# Patient Record
Sex: FEMALE | Race: BLACK OR AFRICAN AMERICAN | Employment: UNEMPLOYED | URBAN - METROPOLITAN AREA
[De-identification: names, ages, dates, MRNs, and addresses within clinical notes are randomized per-mention and may not be internally consistent; named-entity substitution may affect disease eponyms.]

---
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
## 2018-01-03 ENCOUNTER — HOSPITAL ENCOUNTER (EMERGENCY)
Age: 18
Discharge: HOME | End: 2018-01-03
Attending: EMERGENCY MEDICINE
Payer: MEDICAID

## 2018-01-03 VITALS
SYSTOLIC BLOOD PRESSURE: 121 MMHG | BODY MASS INDEX: 39.1 KG/M2 | DIASTOLIC BLOOD PRESSURE: 73 MMHG | HEIGHT: 68 IN | RESPIRATION RATE: 16 BRPM | TEMPERATURE: 98 F | HEART RATE: 84 BPM | WEIGHT: 258 LBS | OXYGEN SATURATION: 99 %

## 2018-01-03 DIAGNOSIS — J01.00 ACUTE NON-RECURRENT MAXILLARY SINUSITIS: Primary | ICD-10-CM

## 2018-01-03 PROCEDURE — 99282 EMERGENCY DEPT VISIT SF MDM: CPT

## 2018-01-03 RX ORDER — PSEUDOEPHEDRINE HYDROCHLORIDE 60 MG/1
60 TABLET ORAL
Qty: 20 TAB | Refills: 1 | Status: SHIPPED | OUTPATIENT
Start: 2018-01-03 | End: 2018-01-08

## 2018-01-03 RX ORDER — AMOXICILLIN AND CLAVULANATE POTASSIUM 875; 125 MG/1; MG/1
1 TABLET, FILM COATED ORAL 2 TIMES DAILY
Qty: 20 TAB | Refills: 0 | Status: SHIPPED | OUTPATIENT
Start: 2018-01-03 | End: 2018-01-13

## 2018-01-03 RX ORDER — TRAMADOL HYDROCHLORIDE 50 MG/1
50 TABLET ORAL
Qty: 12 TAB | Refills: 0 | Status: SHIPPED | OUTPATIENT
Start: 2018-01-03 | End: 2018-06-04

## 2018-01-03 NOTE — DISCHARGE INSTRUCTIONS
Sinusitis: Care Instructions  Your Care Instructions    Sinusitis is an infection of the lining of the sinus cavities in your head. Sinusitis often follows a cold. It causes pain and pressure in your head and face. In most cases, sinusitis gets better on its own in 1 to 2 weeks. But some mild symptoms may last for several weeks. Sometimes antibiotics are needed. Follow-up care is a key part of your treatment and safety. Be sure to make and go to all appointments, and call your doctor if you are having problems. It's also a good idea to know your test results and keep a list of the medicines you take. How can you care for yourself at home? · Take an over-the-counter pain medicine, such as acetaminophen (Tylenol), ibuprofen (Advil, Motrin), or naproxen (Aleve). Read and follow all instructions on the label. · If the doctor prescribed antibiotics, take them as directed. Do not stop taking them just because you feel better. You need to take the full course of antibiotics. · Be careful when taking over-the-counter cold or flu medicines and Tylenol at the same time. Many of these medicines have acetaminophen, which is Tylenol. Read the labels to make sure that you are not taking more than the recommended dose. Too much acetaminophen (Tylenol) can be harmful. · Breathe warm, moist air from a steamy shower, a hot bath, or a sink filled with hot water. Avoid cold, dry air. Using a humidifier in your home may help. Follow the directions for cleaning the machine. · Use saline (saltwater) nasal washes to help keep your nasal passages open and wash out mucus and bacteria. You can buy saline nose drops at a grocery store or drugstore. Or you can make your own at home by adding 1 teaspoon of salt and 1 teaspoon of baking soda to 2 cups of distilled water. If you make your own, fill a bulb syringe with the solution, insert the tip into your nostril, and squeeze gently. Andrea Raider your nose.   · Put a hot, wet towel or a warm gel pack on your face 3 or 4 times a day for 5 to 10 minutes each time. · Try a decongestant nasal spray like oxymetazoline (Afrin). Do not use it for more than 3 days in a row. Using it for more than 3 days can make your congestion worse. When should you call for help? Call your doctor now or seek immediate medical care if:  ? · You have new or worse swelling or redness in your face or around your eyes. ? · You have a new or higher fever. ? Watch closely for changes in your health, and be sure to contact your doctor if:  ? · You have new or worse facial pain. ? · The mucus from your nose becomes thicker (like pus) or has new blood in it. ? · You are not getting better as expected. Where can you learn more? Go to http://alejandro-flakito.info/. Enter H178 in the search box to learn more about \"Sinusitis: Care Instructions. \"  Current as of: May 12, 2017  Content Version: 11.4  © 9338-7821 [employer]. Care instructions adapted under license by [employer] (which disclaims liability or warranty for this information). If you have questions about a medical condition or this instruction, always ask your healthcare professional. [address] any warranty or liability for your use of this information.

## 2018-01-03 NOTE — LETTER
[address] [place] [address] [facility] 
[address] [facility] [address] 
[phone] Work/School Note Date: 1/3/2018 To Whom It May concern: 
 
Alana Dumont was seen and treated today in the emergency room by the following provider(s): 
Attending Provider: Yelena Mohr DO Physician Assistant: Enrique Wiggins may return to work on 1/5/18. Sincerely, LAVERNE Wiggins

## 2018-01-03 NOTE — LETTER
[address] [place] [address] [facility] 
[address] [facility] [address] 
[phone] Work/School Note Date: 1/3/2018 To Whom It May concern: 
 
Mi Rebollar was seen and treated today in the emergency room by the following provider(s): 
Attending Provider: Rom Smith DO Physician Assistant: Enrique Richard may return to work on 1/5/17. Sincerely, LAVERNE Richard

## 2018-01-03 NOTE — ED TRIAGE NOTES
Patient c/o dental pain to left lower and upper jaw x 2 weeks. States ear pain x 3 days. States sinus pain greater than two weeks.

## 2018-01-03 NOTE — ED PROVIDER NOTES
[address] EMERGENCY DEPT      3:16 PM    Date: 1/3/2018  Patient Name: Preet Baca    History of Presenting Illness     Chief Complaint   Patient presents with    Sinus Pain    Ear Pain    Dental Pain     21 y.o. female presents to the ED c/o sinus pain described as soreness for the past 3 weeks. She states she has nasal congestion, rhinorrhea, and soreness to her teeth as well. She does not have a focal toothache. She has taken BC's and Tylenol with minimal relief. Now also complains of bilateral ear pain. Denies fever, chills, cough, SOB, or other symptoms at this time. No other complaints. Nursing nurses regarding the HPI and triage nursing notes were reviewed. Prior medical records were reviewed. Current Outpatient Prescriptions   Medication Sig Dispense Refill    amoxicillin-clavulanate (AUGMENTIN) 875-125 mg per tablet Take 1 Tab by mouth two (2) times a day for 10 days. 20 Tab 0    traMADol (ULTRAM) 50 mg tablet Take 1 Tab by mouth every six (6) hours as needed for Pain. Max Daily Amount: 200 mg. 12 Tab 0    pseudoephedrine (SUDAFED) 60 mg tablet Take 1 Tab by mouth every six (6) hours as needed for Congestion for up to 5 days. 20 Tab 1       Past History     Past Medical History:  Past Medical History:   Diagnosis Date    Depression        Past Surgical History:  No past surgical history on file. Family History:  No family history on file. Social History:  Social History   Substance Use Topics    Smoking status: Never Smoker    Smokeless tobacco: Not on file    Alcohol use No       Allergies:  No Known Allergies    Patient's primary care provider (as noted in EPIC):  None    Constitutional:  Denies malaise, fever, chills. ENMT:  + facial pain, nasal congestion, rhinorrhea, bilateral ear pain. Respiratory:  Denies cough, wheezing, difficulty breathing, shortness of breath. GI/ABD:  Denies injury, pain, distention, nausea, vomiting, diarrhea.    Skin: Denies injury, rash, itching or skin changes. All other systems negative as reviewed. Visit Vitals    /73 (BP 1 Location: Left arm, BP Patient Position: At rest)    Pulse 84    Temp 98 °F (36.7 °C)    Resp 16    Ht 5' 8\" (1.727 m)    Wt 117 kg (258 lb)    SpO2 99%    BMI 39.23 kg/m2       PHYSICAL EXAM:    CONSTITUTIONAL:  Alert, in no apparent distress;  well developed;  well nourished. HEAD:  Normocephalic, atraumatic. Sinuses:  Sinus pressure with leaning head forward. Sinus tenderness to percussion to maxillary sinuses and anterior left teeth (without any abnormal focal tooth findings). EYES:  EOMI. Non-icteric sclera. Normal conjunctiva. ENTM:  Nose:  Nasal turbinates erythematous and edematous, scant clear rhinorrhea. Throat:  no erythema or exudate, mucous membranes moist, airway patent, uvula midline. NECK:  Supple  RESPIRATORY:  Chest clear, equal breath sounds, good air movement. Without wheezes, rhonchi or rales. CARDIOVASCULAR:  Regular rate and rhythm. No murmurs, rubs, or gallops. NEURO:  Moves all four extremities, and grossly normal motor exam.  SKIN:  No rashes;  Normal for age. PSYCH:  Alert and normal affect. ED COURSE:      Pt appears to have sinusitis. She does not have any focal TTP to her teeth, but does have URI symptoms with sinus pain with palpation. Sx's x 3 weeks. Will treat with Augmentin and have pt f/u with PCP. Diagnosis:   1.  Acute non-recurrent maxillary sinusitis      Disposition: Discharge    Follow-up Information     Follow up With Details Comments Contact Info    [facility] In 3 days  [address] [facility] [employer]  [address] [facility] EMERGENCY DEPT  If symptoms worsen [address] [id number] [phone]          Patient's Medications   Start Taking    AMOXICILLIN-CLAVULANATE (AUGMENTIN) 875-125 MG PER TABLET    Take 1 Tab by mouth two (2) times a day for 10 days. PSEUDOEPHEDRINE (SUDAFED) 60 MG TABLET    Take 1 Tab by mouth every six (6) hours as needed for Congestion for up to 5 days. TRAMADOL (ULTRAM) 50 MG TABLET    Take 1 Tab by mouth every six (6) hours as needed for Pain. Max Daily Amount: 200 mg.    Continue Taking    No medications on file   These Medications have changed    No medications on file   Stop Taking    No medications on file     LAVERNE Victoria

## 2018-06-04 ENCOUNTER — APPOINTMENT (OUTPATIENT)
Dept: GENERAL RADIOLOGY | Age: 18
End: 2018-06-04
Attending: EMERGENCY MEDICINE
Payer: MEDICAID

## 2018-06-04 VITALS
DIASTOLIC BLOOD PRESSURE: 74 MMHG | OXYGEN SATURATION: 100 % | HEIGHT: 68 IN | SYSTOLIC BLOOD PRESSURE: 120 MMHG | BODY MASS INDEX: 40.77 KG/M2 | WEIGHT: 269 LBS | HEART RATE: 77 BPM | RESPIRATION RATE: 16 BRPM | TEMPERATURE: 98.3 F

## 2018-06-04 PROCEDURE — 73610 X-RAY EXAM OF ANKLE: CPT

## 2018-06-04 PROCEDURE — 73564 X-RAY EXAM KNEE 4 OR MORE: CPT

## 2018-06-04 PROCEDURE — 99283 EMERGENCY DEPT VISIT LOW MDM: CPT

## 2018-06-05 ENCOUNTER — HOSPITAL ENCOUNTER (EMERGENCY)
Age: 18
Discharge: HOME | End: 2018-06-05
Attending: EMERGENCY MEDICINE | Admitting: EMERGENCY MEDICINE
Payer: MEDICAID

## 2018-06-05 DIAGNOSIS — S80.12XA CONTUSION OF LEG, LEFT, INITIAL ENCOUNTER: Primary | ICD-10-CM

## 2018-06-05 PROCEDURE — 74011250637 HC RX REV CODE- 250/637: Performed by: EMERGENCY MEDICINE

## 2018-06-05 RX ORDER — IBUPROFEN 600 MG/1
600 TABLET ORAL
Status: COMPLETED | OUTPATIENT
Start: 2018-06-05 | End: 2018-06-05

## 2018-06-05 RX ORDER — IBUPROFEN 600 MG/1
600 TABLET ORAL
Qty: 20 TAB | Refills: 0 | Status: SHIPPED | OUTPATIENT
Start: 2018-06-05

## 2018-06-05 RX ADMIN — IBUPROFEN 600 MG: 600 TABLET ORAL at 01:45

## 2018-06-05 NOTE — ED NOTES
Pt instructed to rest/ice/elevate extremity, apply ice frequently to affected area, to follow up with her PCP, take her meds as prescribed, and to return for worsening pain/redness/swelling/other concens. Pt affect remains calm/respirations regular/non labored/skin warm/dry.

## 2018-06-05 NOTE — LETTER
[address] [place] [address] [facility] 
[address] [facility] [address] [phone] Work/School Note Date: 6/4/2018 To Whom It May concern: 
 
Anai Gruber was seen and treated today in the emergency room by the following provider(s): 
Attending Provider: Alison Rosario MD.   
 
Anai Cissetz - please excuse her from work 6/5 and 6/6 (2018). Sincerely, Kay Rhodes RN

## 2018-06-05 NOTE — DISCHARGE INSTRUCTIONS
Contusion: Care Instructions  Your Care Instructions    Contusion is the medical term for a bruise. It is the result of a direct blow or an impact, such as a fall. Contusions are common sports injuries. Most people think of a bruise as a black-and-blue spot. This happens when small blood vessels get torn and leak blood under the skin. But bones, muscles, and organs can also get bruised. This may damage deep tissues but not cause a bruise you can see. The doctor will do a physical exam to find the location of your contusion. You may also have tests to make sure you do not have a more serious injury, such as a broken bone or nerve damage. These may include X-rays or other imaging tests like a CT scan or MRI. Deep-tissue contusions may cause pain and swelling. But if there is no serious damage, they will often get better in a few weeks with home treatment. The doctor has checked you carefully, but problems can develop later. If you notice any problems or new symptoms, get medical treatment right away. Follow-up care is a key part of your treatment and safety. Be sure to make and go to all appointments, and call your doctor if you are having problems. It's also a good idea to know your test results and keep a list of the medicines you take. How can you care for yourself at home? · Put ice or a cold pack on the sore area for 10 to 20 minutes at a time to stop swelling. Put a thin cloth between the ice pack and your skin. · Be safe with medicines. Read and follow all instructions on the label. ¨ If the doctor gave you a prescription medicine for pain, take it as prescribed. ¨ If you are not taking a prescription pain medicine, ask your doctor if you can take an over-the-counter medicine. · If you can, prop up the sore area on pillows as much as possible for the next few days. Try to keep the sore area above the level of your heart. When should you call for help?   Call your doctor now or seek immediate medical care if:  ? · Your pain gets worse. ? · You have new or worse swelling. ? · You have tingling, weakness, or numbness in the area near the contusion. ? · The area near the contusion is cold or pale. ? Watch closely for changes in your health, and be sure to contact your doctor if:  ? · You do not get better as expected. Where can you learn more? Go to http://alejandro-flakito.info/. Enter P235 in the search box to learn more about \"Contusion: Care Instructions. \"  Current as of: March 20, 2017  Content Version: 11.4  © 4529-2677 [employer]. Care instructions adapted under license by "[employer]" (which disclaims liability or warranty for this information). If you have questions about a medical condition or this instruction, always ask your healthcare professional. [address] any warranty or liability for your use of this information.

## 2018-06-05 NOTE — ED TRIAGE NOTES
Pt reports 2 episodes of tripping over dogs and falling down steps in the past week. C/o left knee & left ankle pain.

## 2018-06-05 NOTE — ED PROVIDER NOTES
EMERGENCY DEPARTMENT HISTORY AND PHYSICAL EXAM    12:21 AM      Date: 6/5/2018  Patient Name: Carlin Bonds    History of Presenting Illness     Chief Complaint   Patient presents with   [address] [facility] [address] [facility]    Knee Pain    Ankle Pain         History Provided By: Patient    Chief Complaint: Knee Pain  Duration: 1 Weeks  Timing:  Intermittent  Location: Left Knee  Quality: Aching  Severity: Mild  Modifying Factors: No worsening or alleviating factors. Pt tried nothing for this PTA. Associated Symptoms: denies any other associated signs or symptoms      Additional History (Context): Carlin Bonds is a 21 y.o. female with No significant past medical history who presents with mild, aching, intermittent, left knee pain, onset 1 week PTA, with no associated symptoms. Pt states that she tripped over her dogs at home and fell down stairs onto her left knee. Pt denies any loss of consciousness, head injury, on any other symptoms or complaints at this time. PCP: None        Past History     Past Medical History:  Past Medical History:   Diagnosis Date    Depression        Past Surgical History:  History reviewed. No pertinent surgical history. Family History:  History reviewed. No pertinent family history. Social History:  Social History   Substance Use Topics    Smoking status: Never Smoker    Smokeless tobacco: Never Used    Alcohol use No       Allergies:  No Known Allergies      Review of Systems       Review of Systems   Constitutional: Negative. Negative for chills and fever. Eyes: Negative. Respiratory: Negative. Negative for shortness of breath. Cardiovascular: Negative. Negative for chest pain. Gastrointestinal: Negative. Negative for abdominal pain. Endocrine: Negative. Genitourinary: Negative. Musculoskeletal: Negative for back pain. Left knee Pain     Skin: Negative. Allergic/Immunologic: Negative. Neurological: Negative. Negative for headaches.    Hematological: Negative. Psychiatric/Behavioral: Negative. All other systems reviewed and are negative. Physical Exam     Visit Vitals    /74 (BP 1 Location: Left arm, BP Patient Position: At rest)    Pulse 77    Temp 98.3 °F (36.8 °C)    Resp 16    Ht 5' 8\" (1.727 m)    Wt 122 kg (269 lb)    LMP 04/22/2018    SpO2 100%    BMI 40.9 kg/m2         Physical Exam   Constitutional: She is oriented to person, place, and time. She appears well-developed and well-nourished. No distress. HENT:   Head: Normocephalic. Right Ear: External ear normal.   Left Ear: External ear normal.   Mouth/Throat: No oropharyngeal exudate. Eyes: Conjunctivae and EOM are normal. Pupils are equal, round, and reactive to light. Right eye exhibits no discharge. Left eye exhibits no discharge. No scleral icterus. Neck: Normal range of motion. Neck supple. No JVD present. No tracheal deviation present. No thyromegaly present. Cardiovascular: Normal rate, regular rhythm, normal heart sounds and intact distal pulses. Exam reveals no gallop and no friction rub. No murmur heard. Pulmonary/Chest: Effort normal and breath sounds normal. No stridor. No respiratory distress. She has no wheezes. She has no rales. She exhibits no tenderness. Abdominal: Soft. Bowel sounds are normal. She exhibits no distension and no mass. There is no tenderness. There is no rebound and no guarding. Musculoskeletal: Normal range of motion. Isolation left anterior knee and ankle  tenderness to palpation. No edema, normal pulsation, normal sensation, normal ROM. Lymphadenopathy:     She has no cervical adenopathy. Neurological: She is alert and oriented to person, place, and time. She displays normal reflexes. No cranial nerve deficit. She exhibits normal muscle tone. Coordination normal.   Skin: Skin is warm and dry. No rash noted. She is not diaphoretic. No erythema. No pallor. Nursing note and vitals reviewed.     Diagnostic Study Results Radiologic Studies -   XR KNEE LT MIN 4 V    (Results Pending)   XR ANKLE LT MIN 3 V    (Results Pending)       Medical Decision Making   I am the first provider for this patient. I reviewed the vital signs, available nursing notes, past medical history, past surgical history, family history and social history. Vital Signs-Reviewed the patient's vital signs. Pulse Oximetry Analysis -  100% on room air (Interpretation)normal    Records Reviewed: Nursing Notes and Old Medical Records (Time of Review: 12:21 AM)    ED Course: Progress Notes, Reevaluation, and Consults:    Patient remained stable. Provider Notes (Medical Decision Making): Fracture, Contusion, Dislocation, Hematoma    Diagnosis     Clinical Impression:   1. Contusion of leg, left, initial encounter        Disposition: Discharged    Follow-up Information     Follow up With Details Comments [address] Call in 2 days For Follow up [address] [facility] [id number] [phone] [address] [facility] EMERGENCY DEPT Go to As needed, If symptoms worsen [address] [facility]  [phone]           Patient's Medications   Start Taking    IBUPROFEN (MOTRIN) 600 MG TABLET    Take 1 Tab by mouth every six (6) hours as needed for Pain. Continue Taking    No medications on file   These Medications have changed    No medications on file   Stop Taking    TRAMADOL (ULTRAM) 50 MG TABLET    Take 1 Tab by mouth every six (6) hours as needed for Pain.  Max Daily Amount: 200 mg.     _______________________________    Attestations:  Kaveh Hernandez 74 acting as a scribe for and in the presence of Kandy Leonardo MD      June 05, 2018 at 12:21 AM       Provider Attestation:      I personally performed the services described in the documentation, reviewed the documentation, as recorded by the scribe in my presence, and it accurately and completely records my words and actions.  June 05, 2018 at 12:21 JAX Gutiérrez MD    _______________________________

## 2021-01-03 PROBLEM — Z22.338 BETA-HEMOLYTIC STREPTOCOCCUS CARRIER: Status: ACTIVE | Noted: 2021-01-03

## 2021-01-03 PROBLEM — Z34.90 ENCOUNTER FOR ELECTIVE INDUCTION OF LABOR: Status: ACTIVE | Noted: 2021-01-03

## 2021-01-03 PROBLEM — O09.30 LATE PRENATAL CARE: Status: ACTIVE | Noted: 2021-01-03
